# Patient Record
Sex: MALE | Employment: FULL TIME | URBAN - METROPOLITAN AREA
[De-identification: names, ages, dates, MRNs, and addresses within clinical notes are randomized per-mention and may not be internally consistent; named-entity substitution may affect disease eponyms.]

---

## 2025-02-07 ENCOUNTER — OFFICE VISIT (OUTPATIENT)
Dept: PRIMARY CARE | Facility: CLINIC | Age: 45
End: 2025-02-07
Payer: COMMERCIAL

## 2025-02-07 VITALS
SYSTOLIC BLOOD PRESSURE: 112 MMHG | RESPIRATION RATE: 16 BRPM | TEMPERATURE: 98.2 F | BODY MASS INDEX: 26.44 KG/M2 | HEART RATE: 96 BPM | DIASTOLIC BLOOD PRESSURE: 74 MMHG | HEIGHT: 74 IN | WEIGHT: 206 LBS | OXYGEN SATURATION: 99 %

## 2025-02-07 DIAGNOSIS — R47.02 DYSPHASIA: Primary | ICD-10-CM

## 2025-02-07 PROBLEM — G47.00 INSOMNIA: Status: ACTIVE | Noted: 2025-02-07

## 2025-02-07 PROBLEM — M25.572 LEFT ANKLE PAIN: Status: RESOLVED | Noted: 2025-02-07 | Resolved: 2025-02-07

## 2025-02-07 PROBLEM — M23.90 INTERNAL DERANGEMENT OF KNEE: Status: ACTIVE | Noted: 2020-04-09

## 2025-02-07 PROBLEM — G89.29 CHRONIC PAIN: Status: ACTIVE | Noted: 2025-02-07

## 2025-02-07 PROBLEM — R03.0 ELEVATED BLOOD PRESSURE READING: Status: ACTIVE | Noted: 2025-02-07

## 2025-02-07 PROBLEM — S90.30XA FOOT CONTUSION: Status: RESOLVED | Noted: 2025-02-07 | Resolved: 2025-02-07

## 2025-02-07 PROBLEM — J06.9 ACUTE URI: Status: RESOLVED | Noted: 2025-02-07 | Resolved: 2025-02-07

## 2025-02-07 PROBLEM — F90.9 ADHD: Status: ACTIVE | Noted: 2025-02-07

## 2025-02-07 PROBLEM — S92.909A FOOT FRACTURE: Status: RESOLVED | Noted: 2025-02-07 | Resolved: 2025-02-07

## 2025-02-07 PROBLEM — R05.8 NONPRODUCTIVE COUGH: Status: RESOLVED | Noted: 2025-02-07 | Resolved: 2025-02-07

## 2025-02-07 PROCEDURE — 3008F BODY MASS INDEX DOCD: CPT

## 2025-02-07 PROCEDURE — 99204 OFFICE O/P NEW MOD 45 MIN: CPT

## 2025-02-07 RX ORDER — DOXEPIN HYDROCHLORIDE 100 MG/1
200 CAPSULE ORAL NIGHTLY
COMMUNITY
Start: 2025-01-17

## 2025-02-07 RX ORDER — BUPRENORPHINE HYDROCHLORIDE AND NALOXONE HYDROCHLORIDE DIHYDRATE 8; 2 MG/1; MG/1
TABLET SUBLINGUAL 2 TIMES DAILY
COMMUNITY
Start: 2025-01-29

## 2025-02-07 RX ORDER — AMITRIPTYLINE HYDROCHLORIDE 100 MG/1
150 TABLET ORAL NIGHTLY
COMMUNITY
Start: 2024-12-11

## 2025-02-07 RX ORDER — DEXTROAMPHETAMINE SACCHARATE, AMPHETAMINE ASPARTATE, DEXTROAMPHETAMINE SULFATE AND AMPHETAMINE SULFATE 7.5; 7.5; 7.5; 7.5 MG/1; MG/1; MG/1; MG/1
1 TABLET ORAL
COMMUNITY
Start: 2025-01-29

## 2025-02-07 NOTE — PROGRESS NOTES
Subjective   Juan Robb is a 44 y.o. male who presents for Weight Loss.    Patient presents with approximately 5-month history of progressive dysphagia.  Over this timeframe due to difficulty swallowing he is lost approximately 60 pounds.  He says that initially he was having trouble swallowing food and other solids, however now he is having difficulty with drinking liquids.  This has been particularly bothersome over the past month.  He drinks approximately 2-3 small cups of water a day.  He also has frequent episodes of spitting up food or liquids that he has tried to ingest.  He has been having difficulty taking his medications and has to dissolve them partially before swallowing.    He has not seen a PCP in many years and past medical history is significant for chronic pain, ADHD, 7.5-pack-year history of smoking, occasional alcohol intake 2-3 times a week 1-2 drinks at a time.    He has never had GI issues and has never seen a GI physician.    He denies any fevers, chills, nausea, abdominal pain, changes in bowel or bladder habits, abdominal bloating.      Visit Vitals  /74 (BP Location: Right arm, Patient Position: Sitting, BP Cuff Size: Adult)   Pulse 96   Temp 36.8 °C (98.2 °F)   Resp 16      Objective   Physical Exam  Vitals reviewed.   Constitutional:       General: He is not in acute distress.     Appearance: Normal appearance. He is not toxic-appearing.   HENT:      Head: Normocephalic and atraumatic.      Nose: Nose normal.      Mouth/Throat:      Mouth: Mucous membranes are dry.   Eyes:      Extraocular Movements: Extraocular movements intact.   Cardiovascular:      Rate and Rhythm: Normal rate and regular rhythm.      Heart sounds: No murmur heard.     No friction rub. No gallop.   Pulmonary:      Effort: Pulmonary effort is normal. No respiratory distress.      Breath sounds: Normal breath sounds. No wheezing, rhonchi or rales.   Skin:     General: Skin is warm and dry.   Neurological:  "     General: No focal deficit present.      Mental Status: He is alert.   Psychiatric:         Mood and Affect: Mood normal.         Behavior: Behavior normal.            Assessment/Plan      Assessment & Plan  Dysphasia  Patient presents with approximate 5-month history of chronic dysphagia that has been progressive.  Initially it was with solids and now has been with liquids for the past 1 to 2 months.  Examination is unremarkable other than slightly dry mucous membranes.  Skin turgor is normal.  Vital signs are stable.  Although he has had very little food and fluid hydration he hemodynamically appears stable does not presenting with signs or symptoms of acute dehydration.  Based on our chart review patient has lost approximately 60 pounds since last he was weighed.  Current differential remains broad and includes but is not limited to achalasia, esophageal mass, Zenker's diverticulum, Jimenez's esophagus.  Given concern for possible dehydration and will TEODORO ordered stat CBC and CMP.  Also evaluate for any signs of anemia blood loss.  Provided referral to GI and also have ordered esophagram which will be performed this upcoming Tuesday.  He send results of lab work will determine if patient needs to have urgent fluid resuscitation.  Orders:    CBC; Future    Comprehensive metabolic panel; Future    Referral to Gastroenterology; Future    [place] [facility] esophagram; Future           This note was partially created using voice recognition software and is inherently subject to errors including those of syntax and \"sound-alike\" substitutions which may escape proofreading. In such instances, original meaning may be extrapolated by contextual derivation.      "

## 2025-02-11 ENCOUNTER — HOSPITAL ENCOUNTER (OUTPATIENT)
Dept: RADIOLOGY | Facility: HOSPITAL | Age: 45
Discharge: HOME | End: 2025-02-11
Payer: COMMERCIAL

## 2025-02-11 ENCOUNTER — DOCUMENTATION (OUTPATIENT)
Dept: HEMATOLOGY/ONCOLOGY | Facility: HOSPITAL | Age: 45
End: 2025-02-11
Payer: COMMERCIAL

## 2025-02-11 DIAGNOSIS — K22.89 ESOPHAGEAL MASS: Primary | ICD-10-CM

## 2025-02-11 DIAGNOSIS — K22.2 ESOPHAGEAL STRICTURE: ICD-10-CM

## 2025-02-11 DIAGNOSIS — R47.02 DYSPHASIA: ICD-10-CM

## 2025-02-11 PROCEDURE — 2500000001 HC RX 250 WO HCPCS SELF ADMINISTERED DRUGS (ALT 637 FOR MEDICARE OP)

## 2025-02-11 PROCEDURE — 74220 X-RAY XM ESOPHAGUS 1CNTRST: CPT

## 2025-02-11 PROCEDURE — 2500000005 HC RX 250 GENERAL PHARMACY W/O HCPCS

## 2025-02-11 PROCEDURE — A9698 NON-RAD CONTRAST MATERIALNOC: HCPCS

## 2025-02-11 RX ADMIN — ANTACID/ANTIFLATULENT 1 PACKET: 380; 550; 10; 10 GRANULE, EFFERVESCENT ORAL at 11:42

## 2025-02-11 RX ADMIN — BARIUM SULFATE 25 ML: 980 POWDER, FOR SUSPENSION ORAL at 11:42

## 2025-02-11 NOTE — PROGRESS NOTES
Referral placed to [facility] by Emmett Silverio MD for esophageal mass, discovered incidentally on esophagram 02/07/2025. I called the patient and discussed his imaging results and the referral. Advised appointment for further evaluation. Patient is agreeable. Visit scheduled 02/14/2025 at [facility] at 1200. Patient appointment information. All questions answered & [facility] contact information provided.     DAVID Langley.[facility]

## 2025-02-11 NOTE — RESULT ENCOUNTER NOTE
Reviewed findings of esophagram with patient. Referral to [facility] diagnostic clinic has been placed to help with coordination of follow up testing and care.

## 2025-02-11 NOTE — PROGRESS NOTES
Esophagram revealing stricture of distal esophagus. Referral to [facility] diagnostic clinic provided to expedite workup including PET/CT, endoscopy, and tissue biopsy.

## 2025-02-12 RX ORDER — NAPROXEN 500 MG/1
500 TABLET ORAL 2 TIMES DAILY
COMMUNITY
Start: 2020-02-27 | End: 2025-02-14 | Stop reason: WASHOUT

## 2025-02-14 ENCOUNTER — APPOINTMENT (OUTPATIENT)
Dept: LAB | Facility: CLINIC | Age: 45
End: 2025-02-14
Payer: COMMERCIAL

## 2025-02-14 ENCOUNTER — OFFICE VISIT (OUTPATIENT)
Dept: HEMATOLOGY/ONCOLOGY | Facility: CLINIC | Age: 45
End: 2025-02-14
Payer: COMMERCIAL

## 2025-02-14 VITALS
BODY MASS INDEX: 26.62 KG/M2 | OXYGEN SATURATION: 99 % | TEMPERATURE: 98.4 F | HEIGHT: 73 IN | WEIGHT: 200.84 LBS | DIASTOLIC BLOOD PRESSURE: 76 MMHG | SYSTOLIC BLOOD PRESSURE: 128 MMHG | HEART RATE: 80 BPM | RESPIRATION RATE: 18 BRPM

## 2025-02-14 DIAGNOSIS — R63.4 WEIGHT LOSS: ICD-10-CM

## 2025-02-14 DIAGNOSIS — K22.89 ESOPHAGEAL MASS: ICD-10-CM

## 2025-02-14 DIAGNOSIS — R13.10 DYSPHAGIA, UNSPECIFIED TYPE: ICD-10-CM

## 2025-02-14 DIAGNOSIS — K22.2 ESOPHAGEAL STRICTURE: Primary | ICD-10-CM

## 2025-02-14 PROBLEM — R03.0 ELEVATED BLOOD PRESSURE READING: Status: RESOLVED | Noted: 2025-02-07 | Resolved: 2025-02-14

## 2025-02-14 PROBLEM — R13.19 OTHER DYSPHAGIA: Status: ACTIVE | Noted: 2025-02-14

## 2025-02-14 LAB
ALBUMIN SERPL BCP-MCNC: 4.1 G/DL (ref 3.4–5)
ALP SERPL-CCNC: 106 U/L (ref 33–120)
ALT SERPL W P-5'-P-CCNC: 40 U/L (ref 10–52)
ANION GAP SERPL CALC-SCNC: 15 MMOL/L (ref 10–20)
AST SERPL W P-5'-P-CCNC: 27 U/L (ref 9–39)
BILIRUB SERPL-MCNC: 0.6 MG/DL (ref 0–1.2)
BUN SERPL-MCNC: 15 MG/DL (ref 6–23)
CALCIUM SERPL-MCNC: 9.3 MG/DL (ref 8.6–10.6)
CHLORIDE SERPL-SCNC: 97 MMOL/L (ref 98–107)
CO2 SERPL-SCNC: 28 MMOL/L (ref 21–32)
CREAT SERPL-MCNC: 0.73 MG/DL (ref 0.5–1.3)
EGFRCR SERPLBLD CKD-EPI 2021: >90 ML/MIN/1.73M*2
ERYTHROCYTE [DISTWIDTH] IN BLOOD BY AUTOMATED COUNT: 16.1 % (ref 11.5–14.5)
GLUCOSE SERPL-MCNC: 88 MG/DL (ref 74–99)
HCT VFR BLD AUTO: 31.5 % (ref 41–52)
HGB BLD-MCNC: 9.9 G/DL (ref 13.5–17.5)
MCH RBC QN AUTO: 23 PG (ref 26–34)
MCHC RBC AUTO-ENTMCNC: 31.4 G/DL (ref 32–36)
MCV RBC AUTO: 73 FL (ref 80–100)
NRBC BLD-RTO: ABNORMAL /100{WBCS}
PLATELET # BLD AUTO: 349 X10*3/UL (ref 150–450)
POTASSIUM SERPL-SCNC: 3.8 MMOL/L (ref 3.5–5.3)
PROT SERPL-MCNC: 6.8 G/DL (ref 6.4–8.2)
RBC # BLD AUTO: 4.3 X10*6/UL (ref 4.5–5.9)
SODIUM SERPL-SCNC: 136 MMOL/L (ref 136–145)
WBC # BLD AUTO: 5.6 X10*3/UL (ref 4.4–11.3)

## 2025-02-14 PROCEDURE — 99204 OFFICE O/P NEW MOD 45 MIN: CPT | Performed by: NURSE PRACTITIONER

## 2025-02-14 PROCEDURE — 85027 COMPLETE CBC AUTOMATED: CPT | Performed by: FAMILY MEDICINE

## 2025-02-14 PROCEDURE — 99214 OFFICE O/P EST MOD 30 MIN: CPT | Performed by: NURSE PRACTITIONER

## 2025-02-14 PROCEDURE — 3008F BODY MASS INDEX DOCD: CPT | Performed by: NURSE PRACTITIONER

## 2025-02-14 PROCEDURE — 80053 COMPREHEN METABOLIC PANEL: CPT | Performed by: FAMILY MEDICINE

## 2025-02-14 ASSESSMENT — ENCOUNTER SYMPTOMS
CARDIOVASCULAR NEGATIVE: 1
NEUROLOGICAL NEGATIVE: 1
VOMITING: 1
DEPRESSION: 0
APPETITE CHANGE: 1
UNEXPECTED WEIGHT CHANGE: 1
MYALGIAS: 1
ENDOCRINE NEGATIVE: 1
EYES NEGATIVE: 1
TROUBLE SWALLOWING: 1
OCCASIONAL FEELINGS OF UNSTEADINESS: 0
RESPIRATORY NEGATIVE: 1
PSYCHIATRIC NEGATIVE: 1
ARTHRALGIAS: 1
LOSS OF SENSATION IN FEET: 0
HEMATOLOGIC/LYMPHATIC NEGATIVE: 1

## 2025-02-14 ASSESSMENT — PAIN SCALES - GENERAL: PAINLEVEL_OUTOF10: 0-NO PAIN

## 2025-02-14 NOTE — PROGRESS NOTES
".Patient ID: Juan Robb is a 44 y.o. male.  Referring Physician: Emmett Dominguez MD  [address] [place] [address]  Primary Care Provider: Darryl Elias DO     Diagnostic clinic initial visit      HPI  Patient presented to his PCP, Darryl Elias 02/07/2025 with approximate 5-month history of chronic dysphagia that has been progressive. Initially it was with solids and now has been with liquids for the past 1 to 2 months. He has lost approximately 60 pounds since last he was weighed 4 or 5 months ago. Esophagram 02/11/2025 showing long segment esophageal stricture in the distal esophagus, concerning for underlying neoplasm. Patient referred to the diagnostic clinic for further workup and management.     Subjective   Please refer to \"Notes/Cancer History\" above for complete History of present illness.     Mr Jaun Robb     - Presents to clinic alone.   - Dysphagia is getting worse.   - Intermittent emesis.   - Started Boost supplements yesterday.   - Chronic pain from injuries. Taking Suboxone for pain. Follows with pain management.   - Continues to work full time at a .      Review of Systems:   Review of Systems   Constitutional:  Positive for appetite change and unexpected weight change.   HENT:   Positive for trouble swallowing.    Eyes: Negative.    Respiratory: Negative.     Cardiovascular: Negative.    Gastrointestinal:  Positive for vomiting.   Endocrine: Negative.    Genitourinary: Negative.     Musculoskeletal:  Positive for arthralgias and myalgias.        Chronic pain from prior injuries    Skin: Negative.    Neurological: Negative.    Hematological: Negative.    Psychiatric/Behavioral: Negative.           MEDICAL HISTORY  Past Medical History:   Diagnosis Date    Acute URI 02/07/2025    Foot contusion 02/07/2025    Left ankle pain 02/07/2025    Nonproductive cough 02/07/2025       FAMILY HISTORY  No family history on file.  Mom-Hodgkin's Lymphoma- passed away " "at 35    TOBACCO HISTORY  Tobacco Use: High Risk (2/7/2025)    Patient History     Smoking Tobacco Use: Every Day     Smokeless Tobacco Use: Never     Passive Exposure: Not on file   Smokes 1/2 PPD x 17 years. Drinks 2-3 shots vodka twice weekly.    SOCIAL HISTORY  Social Connections: Not on file   Works full time at [employer]. Lives alone. No children. Dad supportive in care.      Outpatient Medication Profile:  Current Outpatient Medications on File Prior to Visit   Medication Sig Dispense Refill    amitriptyline (Elavil) 100 mg tablet Take 1.5 tablets (150 mg) by mouth once daily at bedtime.      amphetamine-dextroamphetamine (Adderall) 30 mg tablet Take 1 tablet (30 mg) by mouth every 12 hours.      buprenorphine-naloxone (Suboxone) 8-2 mg SL tablet Place under the tongue 2 times a day.      doxepin (SINEquan) 100 mg capsule Take 2 capsules (200 mg) by mouth once daily at bedtime.      naproxen (Naprosyn) 500 mg tablet Take 1 tablet (500 mg) by mouth 2 times a day.       No current facility-administered medications on file prior to visit.         Performance Status:  Symptomatic; fully ambulatory     Vitals and Measurements:   Vitals:    02/14/25 1224   BP: 128/76   Pulse: 80   Resp: 18   Temp: 36.9 °C (98.4 °F)   TempSrc: Core   SpO2: 99%   Weight: 91.1 kg (200 lb 13.4 oz)   Height: (S) 1.849 m (6' 0.8\")   PainSc: 0-No pain        Physical Exam:   Physical Exam  Constitutional:       Appearance: Normal appearance.   HENT:      Head: Normocephalic and atraumatic.      Nose: Nose normal.      Mouth/Throat:      Mouth: Mucous membranes are moist.      Pharynx: Oropharynx is clear.   Eyes:      Conjunctiva/sclera: Conjunctivae normal.   Cardiovascular:      Rate and Rhythm: Normal rate and regular rhythm.      Pulses: Normal pulses.      Heart sounds: Normal heart sounds.   Pulmonary:      Effort: Pulmonary effort is normal.      Breath sounds: Normal breath sounds.   Abdominal:      General: Bowel sounds are " normal.      Palpations: Abdomen is soft.   Musculoskeletal:         General: Normal range of motion.      Cervical back: Neck supple.   Skin:     General: Skin is warm and dry.   Neurological:      General: No focal deficit present.      Mental Status: He is alert and oriented to person, place, and time.   Psychiatric:         Mood and Affect: Mood normal.         Behavior: Behavior normal.            Lab Results:  I have reviewed these laboratory results:     No visits with results within 1 Month(s) from this visit.   Latest known visit with results is:   [facility]acy Encounter on 01/18/2021   Component Date Value Ref Range Status    SARS-CoV-2 Result 01/18/2021 NOT DETECTED  Not Detected Final    Date of Symptom Onset? (YYYYMMDD)? 01/18/2021 20,210,113   Final         Radiology Result:  I have reviewed the latest Imaging in PACS and the findings are noted in this note. I discussed the results of the latest imaging with the patient. All previous imaging were reviewed at the time it was completed. Full records are available in the EMR for review as well.     [place] [facility] ESOPHAGRAM; 2/11/2025 11:38 am      IMPRESSION:  Long segment esophageal stricture in the distal esophagus, concerning  for underlying neoplasm. The differential diagnosis includes  achalasia, felt to be less likely. Further evaluation with PET-CT,  endoscopy and tissue diagnosis is strongly recommended.     Assessment and Plan:   Assessment/Plan   Mr. Juan Robb is a 44 y.o. male who was referred to the diagnostic clinic for dysphagia and esophogeal stricture seen on esophogram 02/11/2025, concerning for malignancy.     # Diagnostic clinic course  - STAT EGD ordered.   - CBCD/CMP ordered by his PCP. I asked he have these labs drawn today.   - He has a follow up with GI 03/18/2025.  - Patient sent to scheduling and labs prior to dc from clinic.   - Phone visit scheduled 03/07/2025 to follow up on imaging, labs, and discuss next steps.     # Smoking  cessation  - Offered referral to smoking cessation program which patient declined at this time. I am happy to make this referral at anytime if patient desires.     # Health maintenance  -  Continue follow with PCP, Dr. Darryl Elias for wellness visits and age appropriate cancer screenings.     DISCLAIMER:   In preparing for this visit and writing this note, I reviewed all the previous electronic medical records (labs, imaging and medical charts) of the patient available in the physician portal. Significant findings which helped in decision making are recorded  in this chart.     The plan was discussed with the patient. We gave him ample opportunities to ask questions. All questions were answered to his satisfaction and he verbalized understanding.

## 2025-02-25 ENCOUNTER — HOSPITAL ENCOUNTER (OUTPATIENT)
Dept: GASTROENTEROLOGY | Facility: HOSPITAL | Age: 45
Discharge: HOME | End: 2025-02-25
Payer: COMMERCIAL

## 2025-02-25 ENCOUNTER — HOSPITAL ENCOUNTER (OUTPATIENT)
Dept: RADIOLOGY | Facility: HOSPITAL | Age: 45
Discharge: HOME | End: 2025-02-25
Payer: COMMERCIAL

## 2025-02-25 VITALS
RESPIRATION RATE: 16 BRPM | DIASTOLIC BLOOD PRESSURE: 66 MMHG | HEIGHT: 74 IN | HEART RATE: 69 BPM | BODY MASS INDEX: 25.89 KG/M2 | OXYGEN SATURATION: 100 % | TEMPERATURE: 98.6 F | SYSTOLIC BLOOD PRESSURE: 108 MMHG | WEIGHT: 201.72 LBS

## 2025-02-25 DIAGNOSIS — R13.10 DYSPHAGIA, UNSPECIFIED TYPE: ICD-10-CM

## 2025-02-25 DIAGNOSIS — K22.2 ESOPHAGEAL STRICTURE: ICD-10-CM

## 2025-02-25 DIAGNOSIS — R13.19 OTHER DYSPHAGIA: Primary | ICD-10-CM

## 2025-02-25 DIAGNOSIS — R63.4 WEIGHT LOSS: ICD-10-CM

## 2025-02-25 PROCEDURE — 99152 MOD SED SAME PHYS/QHP 5/>YRS: CPT | Performed by: STUDENT IN AN ORGANIZED HEALTH CARE EDUCATION/TRAINING PROGRAM

## 2025-02-25 PROCEDURE — 7100000010 HC PHASE TWO TIME - EACH INCREMENTAL 1 MINUTE

## 2025-02-25 PROCEDURE — 71260 CT THORAX DX C+: CPT

## 2025-02-25 PROCEDURE — 3700000012 HC SEDATION LEVEL 5+ TIME - INITIAL 15 MINUTES 5/> YEARS

## 2025-02-25 PROCEDURE — 2500000004 HC RX 250 GENERAL PHARMACY W/ HCPCS (ALT 636 FOR OP/ED): Mod: JZ | Performed by: STUDENT IN AN ORGANIZED HEALTH CARE EDUCATION/TRAINING PROGRAM

## 2025-02-25 PROCEDURE — 2550000001 HC RX 255 CONTRASTS

## 2025-02-25 PROCEDURE — 43202 ESOPHAGOSCOPY FLEX BIOPSY: CPT | Performed by: STUDENT IN AN ORGANIZED HEALTH CARE EDUCATION/TRAINING PROGRAM

## 2025-02-25 PROCEDURE — 7100000009 HC PHASE TWO TIME - INITIAL BASE CHARGE

## 2025-02-25 PROCEDURE — 2500000005 HC RX 250 GENERAL PHARMACY W/O HCPCS: Performed by: STUDENT IN AN ORGANIZED HEALTH CARE EDUCATION/TRAINING PROGRAM

## 2025-02-25 PROCEDURE — P9045 ALBUMIN (HUMAN), 5%, 250 ML: HCPCS | Mod: JZ | Performed by: STUDENT IN AN ORGANIZED HEALTH CARE EDUCATION/TRAINING PROGRAM

## 2025-02-25 PROCEDURE — 88305 TISSUE EXAM BY PATHOLOGIST: CPT | Mod: TC,AHULAB | Performed by: STUDENT IN AN ORGANIZED HEALTH CARE EDUCATION/TRAINING PROGRAM

## 2025-02-25 PROCEDURE — 99153 MOD SED SAME PHYS/QHP EA: CPT | Performed by: STUDENT IN AN ORGANIZED HEALTH CARE EDUCATION/TRAINING PROGRAM

## 2025-02-25 PROCEDURE — 3700000013 HC SEDATION LEVEL 5+ TIME - EACH ADDITIONAL 15 MINUTES

## 2025-02-25 PROCEDURE — 99223 1ST HOSP IP/OBS HIGH 75: CPT | Performed by: STUDENT IN AN ORGANIZED HEALTH CARE EDUCATION/TRAINING PROGRAM

## 2025-02-25 RX ORDER — MIDAZOLAM HYDROCHLORIDE 5 MG/ML
INJECTION, SOLUTION INTRAMUSCULAR; INTRAVENOUS AS NEEDED
Status: COMPLETED | OUTPATIENT
Start: 2025-02-25 | End: 2025-02-25

## 2025-02-25 RX ORDER — SODIUM CHLORIDE, SODIUM LACTATE, POTASSIUM CHLORIDE, CALCIUM CHLORIDE 600; 310; 30; 20 MG/100ML; MG/100ML; MG/100ML; MG/100ML
100 INJECTION, SOLUTION INTRAVENOUS CONTINUOUS
Status: DISCONTINUED | OUTPATIENT
Start: 2025-02-25 | End: 2025-02-26 | Stop reason: HOSPADM

## 2025-02-25 RX ORDER — FENTANYL CITRATE 50 UG/ML
INJECTION, SOLUTION INTRAMUSCULAR; INTRAVENOUS AS NEEDED
Status: COMPLETED | OUTPATIENT
Start: 2025-02-25 | End: 2025-02-25

## 2025-02-25 RX ORDER — ALBUMIN HUMAN 50 G/1000ML
SOLUTION INTRAVENOUS CONTINUOUS PRN
Status: COMPLETED | OUTPATIENT
Start: 2025-02-25 | End: 2025-02-25

## 2025-02-25 RX ADMIN — IOHEXOL 100 ML: 350 INJECTION, SOLUTION INTRAVENOUS at 16:20

## 2025-02-25 RX ADMIN — FENTANYL CITRATE 25 MCG: 50 INJECTION, SOLUTION INTRAMUSCULAR; INTRAVENOUS at 12:20

## 2025-02-25 RX ADMIN — FENTANYL CITRATE 25 MCG: 50 INJECTION, SOLUTION INTRAMUSCULAR; INTRAVENOUS at 12:15

## 2025-02-25 RX ADMIN — FENTANYL CITRATE 25 MCG: 50 INJECTION, SOLUTION INTRAMUSCULAR; INTRAVENOUS at 12:35

## 2025-02-25 RX ADMIN — MIDAZOLAM HYDROCHLORIDE 2 MG: 5 INJECTION, SOLUTION INTRAMUSCULAR; INTRAVENOUS at 12:05

## 2025-02-25 RX ADMIN — MIDAZOLAM HYDROCHLORIDE 1 MG: 5 INJECTION, SOLUTION INTRAMUSCULAR; INTRAVENOUS at 12:10

## 2025-02-25 RX ADMIN — MIDAZOLAM HYDROCHLORIDE 1 MG: 5 INJECTION, SOLUTION INTRAMUSCULAR; INTRAVENOUS at 12:15

## 2025-02-25 RX ADMIN — FENTANYL CITRATE 50 MCG: 50 INJECTION, SOLUTION INTRAMUSCULAR; INTRAVENOUS at 12:03

## 2025-02-25 RX ADMIN — MIDAZOLAM HYDROCHLORIDE 1 MG: 5 INJECTION, SOLUTION INTRAMUSCULAR; INTRAVENOUS at 12:20

## 2025-02-25 RX ADMIN — FENTANYL CITRATE 25 MCG: 50 INJECTION, SOLUTION INTRAMUSCULAR; INTRAVENOUS at 12:25

## 2025-02-25 RX ADMIN — ALBUMIN HUMAN 12.5 G: 0.05 INJECTION, SOLUTION INTRAVENOUS at 12:21

## 2025-02-25 RX ADMIN — MIDAZOLAM HYDROCHLORIDE 3 MG: 5 INJECTION, SOLUTION INTRAMUSCULAR; INTRAVENOUS at 12:03

## 2025-02-25 RX ADMIN — FENTANYL CITRATE 25 MCG: 50 INJECTION, SOLUTION INTRAMUSCULAR; INTRAVENOUS at 12:10

## 2025-02-25 RX ADMIN — FENTANYL CITRATE 25 MCG: 50 INJECTION, SOLUTION INTRAMUSCULAR; INTRAVENOUS at 12:05

## 2025-02-25 RX ADMIN — MIDAZOLAM HYDROCHLORIDE 2 MG: 5 INJECTION, SOLUTION INTRAMUSCULAR; INTRAVENOUS at 12:25

## 2025-02-25 RX ADMIN — SODIUM CHLORIDE, POTASSIUM CHLORIDE, SODIUM LACTATE AND CALCIUM CHLORIDE 100 ML/HR: 600; 310; 30; 20 INJECTION, SOLUTION INTRAVENOUS at 11:07

## 2025-02-25 RX ADMIN — BENZOCAINE 1 SPRAY: 200 SPRAY DENTAL; ORAL; PERIODONTAL at 12:02

## 2025-02-25 ASSESSMENT — PAIN SCALES - GENERAL

## 2025-02-25 ASSESSMENT — COLUMBIA-SUICIDE SEVERITY RATING SCALE - C-SSRS
1. IN THE PAST MONTH, HAVE YOU WISHED YOU WERE DEAD OR WISHED YOU COULD GO TO SLEEP AND NOT WAKE UP?: NO
6. HAVE YOU EVER DONE ANYTHING, STARTED TO DO ANYTHING, OR PREPARED TO DO ANYTHING TO END YOUR LIFE?: NO
2. HAVE YOU ACTUALLY HAD ANY THOUGHTS OF KILLING YOURSELF?: NO

## 2025-02-25 NOTE — H&P
"History Of Present Illness  Juan Robb is a 44 y.o. male presenting with solid and liquid dysphagia. He has an 80lbs weight loss. He has not been able to eat for 6 weeks. Has a history of smoking.     Past Medical History  Past Medical History:   Diagnosis Date    Acute URI 02/07/2025    Foot contusion 02/07/2025    Left ankle pain 02/07/2025    Nonproductive cough 02/07/2025     Surgical History  Past Surgical History:   Procedure Laterality Date    HERNIA REPAIR      KNEE SURGERY       Social History  He reports that he has been smoking cigarettes. He has never used smokeless tobacco. He reports current alcohol use of about 3.0 standard drinks of alcohol per week. He reports that he does not currently use drugs.    Family History  No family history on file.     Allergies  No Known Allergies  Review of Systems  Pre-sedation Evaluation:  ASA Classification - ASA 2 - Patient with mild systemic disease with no functional limitations  Mallampati Score - II (hard and soft palate, upper portion of tonsils and uvula visible)    Physical Exam   Healthy adult in no acute distress, non toxic appearing  EOMI, no jaundice  Heart is RRR, no murmurs  Lungs are CTAB  Abdomen is soft and nttp  No LE edema  AOx3    Last Recorded Vitals  Blood pressure 119/69, pulse 103, temperature 37 °C (98.6 °F), temperature source Temporal, resp. rate 16, height 1.88 m (6' 2\"), weight 91.5 kg (201 lb 11.5 oz), SpO2 99%.     Assessment/Plan   Proceed with EGD.        PTA/Current Medications:  (Not in a hospital admission)    Current Outpatient Medications   Medication Sig Dispense Refill    amphetamine-dextroamphetamine (Adderall) 30 mg tablet Take 1 tablet (30 mg) by mouth every 12 hours.      buprenorphine-naloxone (Suboxone) 8-2 mg SL tablet Place under the tongue 2 times a day.      doxepin (SINEquan) 100 mg capsule Take 2 capsules (200 mg) by mouth once daily at bedtime.      amitriptyline (Elavil) 100 mg tablet Take 1.5 tablets " (150 mg) by mouth once daily at bedtime.       Current Facility-Administered Medications   Medication Dose Route Frequency Provider Last Rate Last Admin    lactated Ringer's infusion  100 mL/hr intravenous Continuous Cesar Carrasco  mL/hr at 02/25/25 1107 100 mL/hr at 02/25/25 1107     Cesar Carrasco MD

## 2025-02-26 ENCOUNTER — APPOINTMENT (OUTPATIENT)
Dept: SURGERY | Facility: HOSPITAL | Age: 45
End: 2025-02-26
Payer: COMMERCIAL

## 2025-02-26 ENCOUNTER — DOCUMENTATION (OUTPATIENT)
Dept: HEMATOLOGY/ONCOLOGY | Facility: HOSPITAL | Age: 45
End: 2025-02-26
Payer: COMMERCIAL

## 2025-02-26 DIAGNOSIS — K22.2 ESOPHAGEAL STRICTURE: Primary | ICD-10-CM

## 2025-02-26 ASSESSMENT — PAIN SCALES - GENERAL: PAINLEVEL_OUTOF10: 0 - NO PAIN

## 2025-02-26 NOTE — PROGRESS NOTES
I called patient today to discuss EGD yesterday. Due to the severity of the esophageal stricture and inability to swallow PO, I advised admission to the hospital for feeding tube placement. Patient stated he has some things to take care of, and will go to [facility] ER afterwards.     Mile Mcginnis CNP  [facility]

## 2025-02-27 ENCOUNTER — TELEPHONE VISIT (OUTPATIENT)
Dept: GASTROENTEROLOGY | Facility: CLINIC | Age: 45
End: 2025-02-27
Payer: COMMERCIAL

## 2025-02-27 DIAGNOSIS — K22.2 ESOPHAGEAL STRICTURE: Primary | ICD-10-CM

## 2025-02-27 LAB
LABORATORY COMMENT REPORT: NORMAL
PATH REPORT.COMMENTS IMP SPEC: NORMAL
PATH REPORT.FINAL DX SPEC: NORMAL
PATH REPORT.GROSS SPEC: NORMAL
PATH REPORT.RELEVANT HX SPEC: NORMAL
PATH REPORT.TOTAL CANCER: NORMAL

## 2025-02-27 NOTE — PROGRESS NOTES
BRIEF HEPATOLOGY PHONE NOTE    I contacted Juan Robb today by phone for follow up.  I performed an upper endoscopy on this patient 2 days ago.  He was referred to the endoscopy unit because of 6 weeks of inability to swallow and dysphagia.  He was referred by his primary care doctor to the diagnostic clinic with medical oncology, they performed an esophagram which demonstrated a very tight stricture in the mid to distal esophagus concerning for malignancy.  Of note, the patient has a family history of lymphoma and his mother passed away when she was 35 from lymphoma.  He has a history of smoking, half a pack of cigarettes daily.  He has also lost approximately 80 pounds since the onset of this issue 6 weeks ago.    The upper endoscopy was scheduled with moderate sedation and despite 200 mcg of fentanyl and 10 mg of Versed, the patient has significant difficulties with sedation.  Visualization of the stricture was difficult with a regular gastroscope and I switched to an ultrathin gastroscope but I was still unable to traverse the stricture.  I did not see any protruding or friable mass within the esophagus but significant amount of granulation tissue.  2 biopsies were obtained from the stricture site which demonstrated:    FINAL DIAGNOSIS   Mid esophageal stricture, biopsy:  - Acutely inflamed granulation tissue and fibrinopurulent exudate consistent with derivation from ulcer, no microorganisms identified.     The patient also had a stat CT of the abdomen and pelvis, there was a single small node in the upper left lung concerning for malignancy, there is a stricture in the distal esophagus again concerning for malignancy.  There is also a small hypodense lesion within the left lobe of the liver concerning for metastasis.     So far we have not be approved and the patient has malignancy.  I reviewed the patient's medications and none of the medications she is taking or any medications in the past would  have caused pill esophagitis and related stricture.  The patient is not immunosuppressed, no history of mucositis.  The ulceration findings on the biopsy are taken with caution.  I still do have a reasonable degree of suspicion for malignancy, certainly a disseminated lymphoma can present like this.  I have ordered an MRI to reassess the liver lesion.  The patient has a follow-up appointment with thoracic surgery, medical oncology as well as gastroenterology in the next 2 to 3 weeks.    I gave the patient precautions to go to the ER including difficulty managing secretions, inability to swallow, signs and symptoms of aspiration.  Unfortunately he has not been able to eat and I feel this may be a significant issue for him.  Should we determine a strategy for nutrition such as a feeding tube, he would be at significant risk for refeeding syndrome.    Cesar Carrasco MD

## 2025-03-07 ENCOUNTER — TELEMEDICINE (OUTPATIENT)
Dept: HEMATOLOGY/ONCOLOGY | Facility: CLINIC | Age: 45
End: 2025-03-07
Payer: COMMERCIAL

## 2025-03-07 DIAGNOSIS — R63.4 WEIGHT LOSS: ICD-10-CM

## 2025-03-07 DIAGNOSIS — R13.10 DYSPHAGIA, UNSPECIFIED TYPE: ICD-10-CM

## 2025-03-07 DIAGNOSIS — K22.2 ESOPHAGEAL STRICTURE: Primary | ICD-10-CM

## 2025-03-07 PROCEDURE — 99214 OFFICE O/P EST MOD 30 MIN: CPT | Performed by: NURSE PRACTITIONER

## 2025-03-07 ASSESSMENT — ENCOUNTER SYMPTOMS
HEMATOLOGIC/LYMPHATIC NEGATIVE: 1
TROUBLE SWALLOWING: 1
RESPIRATORY NEGATIVE: 1
NEUROLOGICAL NEGATIVE: 1
PSYCHIATRIC NEGATIVE: 1
EYES NEGATIVE: 1
ARTHRALGIAS: 1
ENDOCRINE NEGATIVE: 1
MYALGIAS: 1
UNEXPECTED WEIGHT CHANGE: 1
VOMITING: 1
APPETITE CHANGE: 1
CARDIOVASCULAR NEGATIVE: 1

## 2025-03-07 NOTE — PROGRESS NOTES
".Patient ID: Juan Robb is a 44 y.o. male.  Referring Physician: No referring provider defined for this encounter.  Primary Care Provider: Darryl Elias DO     Diagnostic clinic initial visit      HPI  Patient presented to his PCP, Darryl Elias 02/07/2025 with approximate 5-month history of chronic dysphagia that has been progressive. Initially it was with solids and now has been with liquids for the past 1 to 2 months. He has lost approximately 60 pounds since last he was weighed 4 or 5 months ago. Esophagram 02/11/2025 showing long segment esophageal stricture in the distal esophagus, concerning for underlying neoplasm. Patient referred to the diagnostic clinic for further workup and management.     EGD performed 02/25/2025: Visualization of the stricture was difficult with a regular gastroscope so they switched to an ultrathin gastroscope but were still unable to traverse the stricture.  There was not any protruding or friable mass within the esophagus but significant amount of granulation tissue.  2 biopsies were obtained from the stricture site which demonstrated: Acutely inflamed granulation tissue and fibrinopurulent exudate consistent with derivation from ulcer, no microorganisms identified. He then had a CT CAP 02/25/2025 showing esophageal stricture, lung and liver lesion. Patient referred to thoracic and scheduled for liver MRI.       Subjective   Please refer to \"Notes/Cancer History\" above for complete History of present illness.     Mr Juan Robb     - Continues Boost supplements. Trying to drink as much PO as possible.   - Still not able to eat solid food.   - Chronic pain from injuries. Taking Suboxone for pain. Follows with pain management.   - Stopped working for now as a .      Review of Systems:   Review of Systems   Constitutional:  Positive for appetite change and unexpected weight change.   HENT:   Positive for trouble swallowing.    Eyes: Negative.    Respiratory: " Negative.     Cardiovascular: Negative.    Gastrointestinal:  Positive for vomiting.   Endocrine: Negative.    Genitourinary: Negative.     Musculoskeletal:  Positive for arthralgias and myalgias.        Chronic pain from prior injuries    Skin: Negative.    Neurological: Negative.    Hematological: Negative.    Psychiatric/Behavioral: Negative.           MEDICAL HISTORY  Past Medical History:   Diagnosis Date    Acute URI 02/07/2025    Foot contusion 02/07/2025    Left ankle pain 02/07/2025    Nonproductive cough 02/07/2025       FAMILY HISTORY  No family history on file.  Mom-Hodgkin's Lymphoma- passed away at 35    TOBACCO HISTORY  Tobacco Use: High Risk (2/25/2025)    Patient History     Smoking Tobacco Use: Every Day     Smokeless Tobacco Use: Never     Passive Exposure: Not on file   Smokes 1/2 PPD x 17 years. Drinks 2-3 shots vodka twice weekly.    SOCIAL HISTORY  Social Connections: Not on file   Works full time at [email or web address]. Lives alone. No children. Dad supportive in care.      Outpatient Medication Profile:  Current Outpatient Medications on File Prior to Visit   Medication Sig Dispense Refill    amitriptyline (Elavil) 100 mg tablet Take 1.5 tablets (150 mg) by mouth once daily at bedtime.      amphetamine-dextroamphetamine (Adderall) 30 mg tablet Take 1 tablet (30 mg) by mouth every 12 hours.      buprenorphine-naloxone (Suboxone) 8-2 mg SL tablet Place under the tongue 2 times a day.      doxepin (SINEquan) 100 mg capsule Take 2 capsules (200 mg) by mouth once daily at bedtime.       No current facility-administered medications on file prior to visit.         Performance Status:  Symptomatic; fully ambulatory     Vitals and Measurements:   There were no vitals filed for this visit.  Telephone visit      Physical Exam:   Physical Exam   Telephone visit       Lab Results:  I have reviewed these laboratory results:     Hospital Outpatient Visit on 02/25/2025   Component Date Value Ref Range Status    Case  "Report 02/25/2025    Final                    Value:Surgical Pathology                                Case: [id number]                                  Authorizing Provider:  Cesar Carrasco MD      Collected:           02/25/2025 1252              Ordering Location:     [facility]    Received:            02/25/2025 1409              Pathologist:           Jaylen Adler MD                                                             Specimen:    ESOPHAGUS MID BIOPSY, Esophageal Stricture                                                 FINAL DIAGNOSIS 02/25/2025    Final                    Value:Mid esophageal stricture, biopsy:  - Acutely inflamed granulation tissue and fibrinopurulent exudate consistent with derivation from ulcer, no microorganisms identified.        02/25/2025    Final                    Value:By the signature on this report, the individual or group listed as making the Final Interpretation/Diagnosis certifies that they have reviewed this case.       Comment 02/25/2025    Final                    Value:Multiple deeper levels were examined.  Mucosal tissue is not identified.      Clinical History 02/25/2025    Final                    Value:Esophageal stricture [K22.2]  Weight loss [R63.4]  Dysphagia, unspecified type [R13.10]      Gross Description 02/25/2025    Final                    Value:A:  Received in formalin, labeled with the patient's name and hospital number and \"1,esophagus stricture \", is a fragment of tan soft tissue measuring 0.2 x 0.1 x 0.1 cm. The specimen is submitted in toto in 1 cassette.  KE/SBS             Radiology Result:  I have reviewed the latest Imaging in PACS and the findings are noted in this note. I discussed the results of the latest imaging with the patient. All previous imaging were reviewed at the time it was completed. Full records are available in the EMR for review as well.     [place] GI ESOPHAGRAM; 2/11/2025 11:38 am      IMPRESSION:  Long segment " esophageal stricture in the distal esophagus, concerning  for underlying neoplasm. The differential diagnosis includes  achalasia, felt to be less likely. Further evaluation with PET-CT,  endoscopy and tissue diagnosis is strongly recommended.  ==================================================  CT CHEST ABDOMEN PELVIS W IV CONTRAST; 2/25/2025 4:20 pm     IMPRESSION:  Suspicious esophageal stricture for malignancy. There is also a left  upper lobe suspicious pulmonary nodule for metastatic disease  especially without old studies to assess for comparison.      Indeterminate liver lesion. Consider further evaluation with liver  MRI to evaluate this further.      Ventral hernia containing fat      Diverticulosis. Constipation.      Enlarged prostate gland    Pathology results    Surgical Pathology Exam: [id number]  Order: [id number]   Collected 2/25/2025 12:52    Status: Final result    Visible to patient: No (inaccessible in [facility])    Dx: Esophageal stricture; Weight loss; Dy...    0 Result Notes       Component  Resulting Agency   FINAL DIAGNOSIS   Mid esophageal stricture, biopsy:  - Acutely inflamed granulation tissue and fibrinopurulent exudate consistent with derivation from ulcer, no microorganisms identified.   Electronically signed by Jaylen Adler MD on 2/27/2025 at 1403           Assessment and Plan:   Assessment/Plan   Mr. Juan Robb is a 44 y.o. male who was referred to the diagnostic clinic for dysphagia and esophogeal stricture seen on esophogram 02/11/2025, concerning for malignancy.     # Diagnostic clinic course  - EGD 02/25/2025: Visualization of the stricture was difficult with a regular gastroscope so they switched to an ultrathin gastroscope but were still unable to traverse the stricture.  There was not any protruding or friable mass within the esophagus but significant amount of granulation tissue.  2 biopsies were obtained from the stricture site which demonstrated: Acutely  inflamed granulation tissue and fibrinopurulent exudate consistent with derivation from ulcer, no microorganisms identified. He then had a CT CAP 02/25/2025 showing esophageal stricture, lung and liver lesion. Patient referred to thoracic and scheduled for liver MRI.    - Appointment with thoracic 03/12/2025.  - Liver MRI 03/17/2025.  - He has an appointment with GI 03/18/2025.  - I reviewed all these appointments with him. He is aware and agreeable. Appointment dates, times and locations provided. I scheduled a follow up visit with him 03/19/2025. We discussed hospital admission but he refused at this time.     # Smoking cessation  - Offered referral to smoking cessation program which patient declined at this time. I am happy to make this referral at anytime if patient desires.     # Health maintenance  -  Continue follow with PCP, Dr. Darryl Elias for wellness visits and age appropriate cancer screenings.     DISCLAIMER:   In preparing for this visit and writing this note, I reviewed all the previous electronic medical records (labs, imaging and medical charts) of the patient available in the physician portal. Significant findings which helped in decision making are recorded  in this chart.     The plan was discussed with the patient. We gave him ample opportunities to ask questions. All questions were answered to his satisfaction and he verbalized understanding.

## 2025-03-12 ENCOUNTER — HOSPITAL ENCOUNTER (OUTPATIENT)
Facility: HOSPITAL | Age: 45
Setting detail: OUTPATIENT SURGERY
End: 2025-03-12
Attending: THORACIC SURGERY (CARDIOTHORACIC VASCULAR SURGERY) | Admitting: THORACIC SURGERY (CARDIOTHORACIC VASCULAR SURGERY)
Payer: COMMERCIAL

## 2025-03-12 ENCOUNTER — PREP FOR PROCEDURE (OUTPATIENT)
Dept: CARDIOTHORACIC SURGERY | Facility: HOSPITAL | Age: 45
End: 2025-03-12

## 2025-03-12 ENCOUNTER — OFFICE VISIT (OUTPATIENT)
Dept: SURGERY | Facility: HOSPITAL | Age: 45
End: 2025-03-12
Payer: COMMERCIAL

## 2025-03-12 VITALS
RESPIRATION RATE: 16 BRPM | BODY MASS INDEX: 24.54 KG/M2 | WEIGHT: 191.1 LBS | HEART RATE: 103 BPM | OXYGEN SATURATION: 100 % | TEMPERATURE: 97.3 F | DIASTOLIC BLOOD PRESSURE: 85 MMHG | SYSTOLIC BLOOD PRESSURE: 122 MMHG

## 2025-03-12 DIAGNOSIS — K22.2 ESOPHAGEAL STRICTURE: ICD-10-CM

## 2025-03-12 DIAGNOSIS — K22.2 ESOPHAGEAL STRICTURE: Primary | ICD-10-CM

## 2025-03-12 DIAGNOSIS — K22.89 ESOPHAGEAL MASS: ICD-10-CM

## 2025-03-12 PROCEDURE — 99205 OFFICE O/P NEW HI 60 MIN: CPT | Performed by: THORACIC SURGERY (CARDIOTHORACIC VASCULAR SURGERY)

## 2025-03-12 PROCEDURE — 99215 OFFICE O/P EST HI 40 MIN: CPT | Performed by: THORACIC SURGERY (CARDIOTHORACIC VASCULAR SURGERY)

## 2025-03-12 ASSESSMENT — PAIN SCALES - GENERAL: PAINLEVEL_OUTOF10: 0-NO PAIN

## 2025-03-12 NOTE — PROGRESS NOTES
HPI:   Juan Robb is a 44 y.o.male referred with Progressive dysphagia and a 70 pound weight loss over the last 4 months.  He has noticed a weight loss over last 4 months and 2 months ago began having dysphagia and is now at the point where he can only swallow liquids.  Of note he also has had some back pain for the last several years which may be unrelated to his dysphagia.  He has no other symptoms related to this condition.  He underwent endoscopy on fibber 25th which showed a stricture in the middle third of the esophagus biopsies showed granulation tissue only.  A CAT scan showed a small lung nodule a small lymph node and a liver lesion which has been investigated by MRI.    Past Medical History:   Diagnosis Date    Acute URI 02/07/2025    Foot contusion 02/07/2025    Left ankle pain 02/07/2025    Nonproductive cough 02/07/2025          Current Outpatient Medications:     amitriptyline (Elavil) 100 mg tablet, Take 1.5 tablets (150 mg) by mouth once daily at bedtime., Disp: , Rfl:     amphetamine-dextroamphetamine (Adderall) 30 mg tablet, Take 1 tablet (30 mg) by mouth every 12 hours., Disp: , Rfl:     buprenorphine-naloxone (Suboxone) 8-2 mg SL tablet, Place under the tongue 2 times a day., Disp: , Rfl:     doxepin (SINEquan) 100 mg capsule, Take 2 capsules (200 mg) by mouth once daily at bedtime., Disp: , Rfl:     PSHx:  SHx: current smoker (20ppy),  rare ETOH, or illicit drugs   FMHx: negative for history of thoracic cancer     ROS  General: negative for fever, chills, weight loss, night sweat  Head: negative for severe headache, vision change, blurred vision,   CV: negative for chest pain, dizziness, lightheadedness   Pulm: negative for shortness of breath, dyspnea on exertion, hemoptysis  GI: negative for diarrhea, constipation, abdominal pain, nausea or vomiting, BRBPR  : negative for dysuria, hematuria, incontinence  Skin: negative for rash  Heme: negative for blood thinner, bleeding disorder  or clotting disorder  Endo: negative for heat or cold intolerance, weight gain or weight loss  MSK: negative for rash, edema, weakness    PHYSICAL EXAM  Constitution: well-developed well-nourished in no acute distress  HEENT: NCAT, moist mucosal membrane, neck supple, no crepitus, sclera anicteric  Lymph nodes: no cervical or supraclavicular lymphadenopathy  Cardiac: RRR, normal S1, S2, no mrg  Pulmonary: normal air movement, CTAP, no wcr  Abdomen: soft, non-distended, non-tender, no rigidity, guarding or rebound tenderness, no splenohepatomegaly  Neuro: AOx3, CNII-XII grossly normal  Ext: warm, dry, no edema noted  Skin: dry, clean and intact  Psych: mood and affect wnl    Results    I looked at his chest and abdominal CT which shows a thickening in the middle esophagus.  There is a small adjacent lymph node.  There is a small left upper lobe nodule which appears mostly calcified.    I looked at his barium swallow study which shows a 3 cm stricture in the mid to lower portion of the esophagus.    I looked at his biopsy results which showed granulation tissue only    Assessment and Plan:    This is a 44-year-old male with progressive dysphagia and a possible thickening of the esophagus.  I recommended a PET scan I have also recommend that I perform an endoscopy with dilation and additional biopsies.  I spent a small risk of perforation and potentially life-threatening infection from a dilation and the benefits of obtaining additional information.  All of his questions answered and a consent to EGD dilation and biopsies        Aaron Gay MD  Chief Division of Thoracic and Esophageal Surgery    [facility]   Co-Director, Thoracic Oncology Program    [facility]  Professor of Surgery    [employer]  Office phone: [phone]  Fax: [phone]

## 2025-03-12 NOTE — H&P (VIEW-ONLY)
HPI:   Juan Robb is a 44 y.o.male referred with Progressive dysphagia and a 70 pound weight loss over the last 4 months.  He has noticed a weight loss over last 4 months and 2 months ago began having dysphagia and is now at the point where he can only swallow liquids.  Of note he also has had some back pain for the last several years which may be unrelated to his dysphagia.  He has no other symptoms related to this condition.  He underwent endoscopy on fibber 25th which showed a stricture in the middle third of the esophagus biopsies showed granulation tissue only.  A CAT scan showed a small lung nodule a small lymph node and a liver lesion which has been investigated by MRI.    Past Medical History:   Diagnosis Date    Acute URI 02/07/2025    Foot contusion 02/07/2025    Left ankle pain 02/07/2025    Nonproductive cough 02/07/2025          Current Outpatient Medications:     amitriptyline (Elavil) 100 mg tablet, Take 1.5 tablets (150 mg) by mouth once daily at bedtime., Disp: , Rfl:     amphetamine-dextroamphetamine (Adderall) 30 mg tablet, Take 1 tablet (30 mg) by mouth every 12 hours., Disp: , Rfl:     buprenorphine-naloxone (Suboxone) 8-2 mg SL tablet, Place under the tongue 2 times a day., Disp: , Rfl:     doxepin (SINEquan) 100 mg capsule, Take 2 capsules (200 mg) by mouth once daily at bedtime., Disp: , Rfl:     PSHx:  SHx: current smoker (20ppy),  rare ETOH, or illicit drugs   FMHx: negative for history of thoracic cancer     ROS  General: negative for fever, chills, weight loss, night sweat  Head: negative for severe headache, vision change, blurred vision,   CV: negative for chest pain, dizziness, lightheadedness   Pulm: negative for shortness of breath, dyspnea on exertion, hemoptysis  GI: negative for diarrhea, constipation, abdominal pain, nausea or vomiting, BRBPR  : negative for dysuria, hematuria, incontinence  Skin: negative for rash  Heme: negative for blood thinner, bleeding disorder  or clotting disorder  Endo: negative for heat or cold intolerance, weight gain or weight loss  MSK: negative for rash, edema, weakness    PHYSICAL EXAM  Constitution: well-developed well-nourished in no acute distress  HEENT: NCAT, moist mucosal membrane, neck supple, no crepitus, sclera anicteric  Lymph nodes: no cervical or supraclavicular lymphadenopathy  Cardiac: RRR, normal S1, S2, no mrg  Pulmonary: normal air movement, CTAP, no wcr  Abdomen: soft, non-distended, non-tender, no rigidity, guarding or rebound tenderness, no splenohepatomegaly  Neuro: AOx3, CNII-XII grossly normal  Ext: warm, dry, no edema noted  Skin: dry, clean and intact  Psych: mood and affect wnl    Results    I looked at his chest and abdominal CT which shows a thickening in the middle esophagus.  There is a small adjacent lymph node.  There is a small left upper lobe nodule which appears mostly calcified.    I looked at his barium swallow study which shows a 3 cm stricture in the mid to lower portion of the esophagus.    I looked at his biopsy results which showed granulation tissue only    Assessment and Plan:    This is a 44-year-old male with progressive dysphagia and a possible thickening of the esophagus.  I recommended a PET scan I have also recommend that I perform an endoscopy with dilation and additional biopsies.  I spent a small risk of perforation and potentially life-threatening infection from a dilation and the benefits of obtaining additional information.  All of his questions answered and a consent to EGD dilation and biopsies        Aaron Gay MD  Chief Division of Thoracic and Esophageal Surgery    [facility]   Co-Director, Thoracic Oncology Program    [facility]  Professor of Surgery    [employer]  Office phone: [phone]  Fax: [phone]

## 2025-03-12 NOTE — PATIENT INSTRUCTIONS
Dr Gay would like you to get a PET scan       PET Scan PREP: Nothing to eat or drink 6 hours prior to the scan.  You should limit yourself to a low carb diet the day before the scan.  (meat, fish, cheese, vegetables & eggs).   Please be sure to drink plenty of water the day before the scan and limit your caffeine. Approximate time 2 hours      You will get a call from our office to schedule a surgery date.     *You will need to get Preadmission testing.  You will get a call to schedule once we know the surgery date.     GENERAL PRESURGICAL INSTRUCTIONS    **Please call the office with any questions.  [phone]       MEDICATIONS:  *Take  your regularly scheduled medications the morning of surgery with a sip of water unless instructed by your surgeon.  If you have any questions ask your surgeon.   Exceptions include:  **Blood thinners, (ie: Plavix, Xarelto, Eliquis, Brilinta) check with your surgeon about holding for surgery. (Aspirin 81 mg is ok to take)   *Ibuprofen, multivitamins, fish oil, supplements- STOP 7 days before surgery.  *Lisinopril/ Losartan - HOLD morning of surgery  *Diabetes medications (ie: metformin, glipizide) - HOLD morning of surgery  *Farxiga, Jardiance - Hold 3 days before surgery  *Monyulianaro, Ozempic, Trulicity, Wegovy, Tanzeum,  Bydyreon - HOLD 1 week prior to surgery    CONTACT SURGEON'S OFFICE IF YOU DEVELOP:  * Fever = 100.4 F   * New respiratory symptoms (e.g. cough, shortness of breath, respiratory distress, sore throat)  * Recent loss of taste or smell  *Flu like symptoms such as headache, fatigue or gastrointestinal symptoms  * You develop any open sores, shingles, burning or painful urination   AND/OR:  * You no longer wish to have the surgery.  * Any other personal circumstances change that may lead to the need to cancel or defer this surgery.  *You were admitted to any hospital within one week of your planned procedure.  *There have been any changes to your insurance, address,  or phone number.     SMOKING:  *Stop smoking, using street drugs, or consuming excessive alcohol.   *Quitting smoking can make a huge difference to your health and recovery from surgery.    *If you need help with quitting, call 8-300-QUIT-NOW.       SURGICAL TIME/PARKING AND ARRIVAL:  [place]: *You will be contacted between 8 am and 12 noon the business day before your surgery with your arrival time. *If you haven't received a call by 4pm, call [phone]  Park in the visitor parking garage on [address], take bridge on 2nd floor, the first desk you come to is admitting to check in for surgery.     *Scheduled surgery times may change and you will be notified if this occurs-check your personal voicemail for any updates.    THE DAY BEFORE SURGERY:  *Do not eat any food after midnight the night before surgery.       *Shower with any antibacterial soap the night before and/or the morning of your surgery.  **After showering, do NOT apply hair products, lotions, powders, creams, makeup, nail polish, Vaseline, or deodorant.**       ON THE MORNING OF SURGERY:  *Do not eat or drink anything.  This includes gum or candy.  *You may take your morning meds with a small sips of water.   DIABETICS:  Please check fasting blood sugar  upon waking up.  If fasting sugar is <80 mg/dl, please drink 100ml/3oz of apple juice no later than 2 hours prior to surgery.  *Brush your teeth before coming to the hospital.   *Do not use moisturizers, creams, lotions or perfume.  *Wear comfortable, loose fitting clothing.   *All jewelry and valuables should be left at home.  *Prosthetic devices such as contact lenses, hearing aids, dentures, eyelash extensions, hairpins and body piercing must be removed before surgery.     BRING WITH YOU:  *Photo ID and insurance card  *Current list of medicines and allergies Include dose and how often you take it.   *Pacemaker/Defibrillator/Heart stent cards  *CPAP machine and  mask  *Slings/splints/crutches/walker  *Copy of your complete Advanced Directive/DHPOA-if applicable  *Neurostimulator implant remote       AFTER INPATIENT SURGERY:  *A responsible adult MUST accompany you at the time of discharge and stay with you for 24 hours after your surgery.  *You may NOT drive yourself home after surgery.  *You may use a taxi or ride sharing service ([employer], Uber) to return home ONLY if you are accompanied by a friend or family member.  *Instructions for resuming your medications will be provided by your surgeon.    FOLLOW UP:  *You will be scheduled for a post op follow up visit Dr. Gay about 2 weeks after you leave the hospital.  ([facility] 1st floor Desk B)  *You may be asked to get a Chest XRAY prior to seeing him.  You can get this done the same day as your appointment 30 min prior in Radiology  [facility] 2nd floor).    *If there is pathology this is usually resulted by this appointment for Dr. Gay to go over with you.

## 2025-03-14 ENCOUNTER — HOSPITAL ENCOUNTER (OUTPATIENT)
Dept: RADIOLOGY | Facility: CLINIC | Age: 45
Discharge: HOME | End: 2025-03-14
Payer: COMMERCIAL

## 2025-03-14 DIAGNOSIS — K22.89 ESOPHAGEAL MASS: ICD-10-CM

## 2025-03-14 DIAGNOSIS — K22.2 ESOPHAGEAL STRICTURE: ICD-10-CM

## 2025-03-14 LAB — GLUCOSE BLD MANUAL STRIP-MCNC: 75 MG/DL (ref 74–99)

## 2025-03-14 PROCEDURE — 3430000001 HC RX 343 DIAGNOSTIC RADIOPHARMACEUTICALS: Performed by: THORACIC SURGERY (CARDIOTHORACIC VASCULAR SURGERY)

## 2025-03-14 PROCEDURE — 82947 ASSAY GLUCOSE BLOOD QUANT: CPT

## 2025-03-14 PROCEDURE — A9552 F18 FDG: HCPCS | Performed by: THORACIC SURGERY (CARDIOTHORACIC VASCULAR SURGERY)

## 2025-03-14 PROCEDURE — 78815 PET IMAGE W/CT SKULL-THIGH: CPT | Mod: PI

## 2025-03-14 RX ORDER — FLUDEOXYGLUCOSE F 18 200 MCI/ML
12.46 INJECTION, SOLUTION INTRAVENOUS
Status: COMPLETED | OUTPATIENT
Start: 2025-03-14 | End: 2025-03-14

## 2025-03-14 RX ADMIN — FLUDEOXYGLUCOSE F 18 12.46 MILLICURIE: 200 INJECTION, SOLUTION INTRAVENOUS at 08:23

## 2025-03-18 ENCOUNTER — ANESTHESIA (OUTPATIENT)
Dept: OPERATING ROOM | Facility: HOSPITAL | Age: 45
End: 2025-03-18

## 2025-03-18 ENCOUNTER — ANESTHESIA EVENT (OUTPATIENT)
Dept: OPERATING ROOM | Facility: HOSPITAL | Age: 45
End: 2025-03-18

## 2025-03-18 ENCOUNTER — APPOINTMENT (OUTPATIENT)
Dept: GASTROENTEROLOGY | Facility: CLINIC | Age: 45
End: 2025-03-18
Payer: COMMERCIAL

## 2025-03-19 ENCOUNTER — DOCUMENTATION (OUTPATIENT)
Dept: HEMATOLOGY/ONCOLOGY | Facility: CLINIC | Age: 45
End: 2025-03-19

## 2025-03-19 ENCOUNTER — APPOINTMENT (OUTPATIENT)
Dept: HEMATOLOGY/ONCOLOGY | Facility: CLINIC | Age: 45
End: 2025-03-19
Payer: COMMERCIAL

## 2025-03-19 NOTE — PROGRESS NOTES
Patient missed his liver MRI and appointment with thoracic this week. He rescheduled his procedure with Dr. Gay to next week. I reached out to scheduling to assist with rescheduling his liver MRI. I rescheduled my visit to 04/01/2025.    Mile Mcginnis [facility]

## 2025-03-25 ENCOUNTER — PREP FOR PROCEDURE (OUTPATIENT)
Dept: CARDIOTHORACIC SURGERY | Facility: HOSPITAL | Age: 45
End: 2025-03-25

## 2025-03-25 DIAGNOSIS — K22.2 ESOPHAGEAL STRICTURE: Primary | ICD-10-CM

## 2025-03-27 ENCOUNTER — ANESTHESIA EVENT (OUTPATIENT)
Dept: OPERATING ROOM | Facility: HOSPITAL | Age: 45
End: 2025-03-27
Payer: COMMERCIAL

## 2025-03-27 ENCOUNTER — HOSPITAL ENCOUNTER (OUTPATIENT)
Facility: HOSPITAL | Age: 45
Setting detail: OUTPATIENT SURGERY
Discharge: HOME | End: 2025-03-27
Attending: THORACIC SURGERY (CARDIOTHORACIC VASCULAR SURGERY) | Admitting: THORACIC SURGERY (CARDIOTHORACIC VASCULAR SURGERY)
Payer: COMMERCIAL

## 2025-03-27 ENCOUNTER — ANESTHESIA (OUTPATIENT)
Dept: OPERATING ROOM | Facility: HOSPITAL | Age: 45
End: 2025-03-27
Payer: COMMERCIAL

## 2025-03-27 VITALS
DIASTOLIC BLOOD PRESSURE: 82 MMHG | HEART RATE: 68 BPM | WEIGHT: 196.9 LBS | HEIGHT: 74 IN | OXYGEN SATURATION: 98 % | SYSTOLIC BLOOD PRESSURE: 126 MMHG | RESPIRATION RATE: 16 BRPM | TEMPERATURE: 97.7 F | BODY MASS INDEX: 25.27 KG/M2

## 2025-03-27 DIAGNOSIS — K22.2 ESOPHAGEAL STRICTURE: ICD-10-CM

## 2025-03-27 PROCEDURE — 2720000007 HC OR 272 NO HCPCS: Performed by: THORACIC SURGERY (CARDIOTHORACIC VASCULAR SURGERY)

## 2025-03-27 PROCEDURE — 43233 EGD BALLOON DIL ESOPH30 MM/>: CPT | Performed by: THORACIC SURGERY (CARDIOTHORACIC VASCULAR SURGERY)

## 2025-03-27 PROCEDURE — C1726 CATH, BAL DIL, NON-VASCULAR: HCPCS | Performed by: THORACIC SURGERY (CARDIOTHORACIC VASCULAR SURGERY)

## 2025-03-27 PROCEDURE — 7100000009 HC PHASE TWO TIME - INITIAL BASE CHARGE: Performed by: THORACIC SURGERY (CARDIOTHORACIC VASCULAR SURGERY)

## 2025-03-27 PROCEDURE — 88305 TISSUE EXAM BY PATHOLOGIST: CPT | Performed by: STUDENT IN AN ORGANIZED HEALTH CARE EDUCATION/TRAINING PROGRAM

## 2025-03-27 PROCEDURE — 7100000002 HC RECOVERY ROOM TIME - EACH INCREMENTAL 1 MINUTE: Performed by: THORACIC SURGERY (CARDIOTHORACIC VASCULAR SURGERY)

## 2025-03-27 PROCEDURE — 2500000005 HC RX 250 GENERAL PHARMACY W/O HCPCS: Mod: SE | Performed by: THORACIC SURGERY (CARDIOTHORACIC VASCULAR SURGERY)

## 2025-03-27 PROCEDURE — A43249 PR EDG BALLOON DILATION ESOPHAGUS <30 MM DIAM: Performed by: ANESTHESIOLOGY

## 2025-03-27 PROCEDURE — 88305 TISSUE EXAM BY PATHOLOGIST: CPT | Mod: TC,SUR | Performed by: THORACIC SURGERY (CARDIOTHORACIC VASCULAR SURGERY)

## 2025-03-27 PROCEDURE — 7100000010 HC PHASE TWO TIME - EACH INCREMENTAL 1 MINUTE: Performed by: THORACIC SURGERY (CARDIOTHORACIC VASCULAR SURGERY)

## 2025-03-27 PROCEDURE — 2500000004 HC RX 250 GENERAL PHARMACY W/ HCPCS (ALT 636 FOR OP/ED): Mod: SE

## 2025-03-27 PROCEDURE — 3700000002 HC GENERAL ANESTHESIA TIME - EACH INCREMENTAL 1 MINUTE: Performed by: THORACIC SURGERY (CARDIOTHORACIC VASCULAR SURGERY)

## 2025-03-27 PROCEDURE — 3700000001 HC GENERAL ANESTHESIA TIME - INITIAL BASE CHARGE: Performed by: THORACIC SURGERY (CARDIOTHORACIC VASCULAR SURGERY)

## 2025-03-27 PROCEDURE — 7100000001 HC RECOVERY ROOM TIME - INITIAL BASE CHARGE: Performed by: THORACIC SURGERY (CARDIOTHORACIC VASCULAR SURGERY)

## 2025-03-27 PROCEDURE — 3600000003 HC OR TIME - INITIAL BASE CHARGE - PROCEDURE LEVEL THREE: Performed by: THORACIC SURGERY (CARDIOTHORACIC VASCULAR SURGERY)

## 2025-03-27 PROCEDURE — 3600000008 HC OR TIME - EACH INCREMENTAL 1 MINUTE - PROCEDURE LEVEL THREE: Performed by: THORACIC SURGERY (CARDIOTHORACIC VASCULAR SURGERY)

## 2025-03-27 RX ORDER — LABETALOL HYDROCHLORIDE 5 MG/ML
5 INJECTION, SOLUTION INTRAVENOUS ONCE AS NEEDED
Status: DISCONTINUED | OUTPATIENT
Start: 2025-03-27 | End: 2025-03-27 | Stop reason: HOSPADM

## 2025-03-27 RX ORDER — FENTANYL CITRATE 50 UG/ML
INJECTION, SOLUTION INTRAMUSCULAR; INTRAVENOUS AS NEEDED
Status: DISCONTINUED | OUTPATIENT
Start: 2025-03-27 | End: 2025-03-27

## 2025-03-27 RX ORDER — ONDANSETRON HYDROCHLORIDE 2 MG/ML
4 INJECTION, SOLUTION INTRAVENOUS ONCE AS NEEDED
Status: DISCONTINUED | OUTPATIENT
Start: 2025-03-27 | End: 2025-03-27 | Stop reason: HOSPADM

## 2025-03-27 RX ORDER — FENTANYL CITRATE 50 UG/ML
25 INJECTION, SOLUTION INTRAMUSCULAR; INTRAVENOUS EVERY 5 MIN PRN
Status: DISCONTINUED | OUTPATIENT
Start: 2025-03-27 | End: 2025-03-27 | Stop reason: HOSPADM

## 2025-03-27 RX ORDER — LIDOCAINE HYDROCHLORIDE 10 MG/ML
0.1 INJECTION, SOLUTION INFILTRATION; PERINEURAL ONCE
Status: DISCONTINUED | OUTPATIENT
Start: 2025-03-27 | End: 2025-03-27 | Stop reason: HOSPADM

## 2025-03-27 RX ORDER — HYDRALAZINE HYDROCHLORIDE 20 MG/ML
5 INJECTION INTRAMUSCULAR; INTRAVENOUS EVERY 30 MIN PRN
Status: DISCONTINUED | OUTPATIENT
Start: 2025-03-27 | End: 2025-03-27 | Stop reason: HOSPADM

## 2025-03-27 RX ORDER — SUCCINYLCHOLINE CHLORIDE 20 MG/ML
INJECTION INTRAMUSCULAR; INTRAVENOUS AS NEEDED
Status: DISCONTINUED | OUTPATIENT
Start: 2025-03-27 | End: 2025-03-27

## 2025-03-27 RX ORDER — MIDAZOLAM HYDROCHLORIDE 1 MG/ML
INJECTION INTRAMUSCULAR; INTRAVENOUS AS NEEDED
Status: DISCONTINUED | OUTPATIENT
Start: 2025-03-27 | End: 2025-03-27

## 2025-03-27 RX ORDER — ONDANSETRON HYDROCHLORIDE 2 MG/ML
INJECTION, SOLUTION INTRAVENOUS AS NEEDED
Status: DISCONTINUED | OUTPATIENT
Start: 2025-03-27 | End: 2025-03-27

## 2025-03-27 RX ORDER — WATER 1 ML/ML
INJECTION IRRIGATION AS NEEDED
Status: DISCONTINUED | OUTPATIENT
Start: 2025-03-27 | End: 2025-03-27 | Stop reason: HOSPADM

## 2025-03-27 RX ORDER — FENTANYL CITRATE 50 UG/ML
50 INJECTION, SOLUTION INTRAMUSCULAR; INTRAVENOUS EVERY 5 MIN PRN
Status: DISCONTINUED | OUTPATIENT
Start: 2025-03-27 | End: 2025-03-27 | Stop reason: HOSPADM

## 2025-03-27 RX ORDER — OXYCODONE HYDROCHLORIDE 5 MG/1
5 TABLET ORAL EVERY 4 HOURS PRN
Status: DISCONTINUED | OUTPATIENT
Start: 2025-03-27 | End: 2025-03-27 | Stop reason: HOSPADM

## 2025-03-27 RX ORDER — PROPOFOL 10 MG/ML
INJECTION, EMULSION INTRAVENOUS AS NEEDED
Status: DISCONTINUED | OUTPATIENT
Start: 2025-03-27 | End: 2025-03-27

## 2025-03-27 RX ORDER — SODIUM CHLORIDE 0.9 G/100ML
INJECTION, SOLUTION IRRIGATION AS NEEDED
Status: DISCONTINUED | OUTPATIENT
Start: 2025-03-27 | End: 2025-03-27 | Stop reason: HOSPADM

## 2025-03-27 RX ORDER — LIDOCAINE HYDROCHLORIDE 20 MG/ML
INJECTION, SOLUTION INFILTRATION; PERINEURAL AS NEEDED
Status: DISCONTINUED | OUTPATIENT
Start: 2025-03-27 | End: 2025-03-27

## 2025-03-27 RX ADMIN — SODIUM CHLORIDE, SODIUM LACTATE, POTASSIUM CHLORIDE, AND CALCIUM CHLORIDE: 600; 310; 30; 20 INJECTION, SOLUTION INTRAVENOUS at 15:40

## 2025-03-27 RX ADMIN — ONDANSETRON 4 MG: 2 INJECTION, SOLUTION INTRAMUSCULAR; INTRAVENOUS at 16:24

## 2025-03-27 RX ADMIN — PROPOFOL 30 MG: 10 INJECTION, EMULSION INTRAVENOUS at 16:20

## 2025-03-27 RX ADMIN — FENTANYL CITRATE 50 MCG: 50 INJECTION, SOLUTION INTRAMUSCULAR; INTRAVENOUS at 15:52

## 2025-03-27 RX ADMIN — LIDOCAINE HYDROCHLORIDE 100 MG: 20 INJECTION, SOLUTION INFILTRATION; PERINEURAL at 15:52

## 2025-03-27 RX ADMIN — PROPOFOL 150 MG: 10 INJECTION, EMULSION INTRAVENOUS at 15:52

## 2025-03-27 RX ADMIN — DEXAMETHASONE SODIUM PHOSPHATE 4 MG: 4 INJECTION INTRA-ARTICULAR; INTRALESIONAL; INTRAMUSCULAR; INTRAVENOUS; SOFT TISSUE at 15:52

## 2025-03-27 RX ADMIN — SUCCINYLCHOLINE CHLORIDE 80 MG: 20 INJECTION, SOLUTION INTRAMUSCULAR; INTRAVENOUS at 15:52

## 2025-03-27 RX ADMIN — MIDAZOLAM HYDROCHLORIDE 1 MG: 2 INJECTION, SOLUTION INTRAMUSCULAR; INTRAVENOUS at 15:52

## 2025-03-27 RX ADMIN — FENTANYL CITRATE 50 MCG: 50 INJECTION, SOLUTION INTRAMUSCULAR; INTRAVENOUS at 16:24

## 2025-03-27 ASSESSMENT — PAIN SCALES - GENERAL
PAINLEVEL_OUTOF10: 0 - NO PAIN

## 2025-03-27 ASSESSMENT — PAIN - FUNCTIONAL ASSESSMENT
PAIN_FUNCTIONAL_ASSESSMENT: 0-10

## 2025-03-27 NOTE — ANESTHESIA PREPROCEDURE EVALUATION
Patient: Juan Robb    Procedure Information       Date/Time: 03/27/25 1435    Procedure: EGD, WITH DILATION (Esophagus)    Location: [facility] OR 20 / [facility] OR    Surgeons: Aaron Gay MD            44 yoM w/ PMHx esophageal stricture with dysphagia, small lung lesion/liver lesion being worked up, 70lb weight loss over past 4 months presenting for EGD w/ dilation and bx w/ Dr. Gay 3/28. PMH of TUD (0.5ppd smoker, did smoke on DOS), chronic pain (on buprenorphine, takes intermittently), ADHD (on adderall)    Relevant Problems   Anesthesia (within normal limits)      Cardiac (within normal limits)      Pulmonary (within normal limits)      Neuro (within normal limits)      GI   (+) Esophageal stricture   (+) Other dysphagia      /Renal (within normal limits)      Liver (within normal limits)      Endocrine (within normal limits)      Hematology (within normal limits)      Musculoskeletal (within normal limits)      HEENT (within normal limits)      ID (within normal limits)      Skin (within normal limits)      GYN (within normal limits)        Medication Documentation Review Audit       Reviewed by NARESH Barillas (Nurse Practitioner) on 03/07/25 at 1414      Medication Order Taking? Sig Documenting Provider Last Dose Status   amitriptyline (Elavil) 100 mg tablet [id number] No Take 1.5 tablets (150 mg) by mouth once daily at bedtime. Historical Provider, MD Unknown Active   amphetamine-dextroamphetamine (Adderall) 30 mg tablet [id number] No Take 1 tablet (30 mg) by mouth every 12 hours. Matti Mendieta MD 2/24/2025 Active   buprenorphine-naloxone (Suboxone) 8-2 mg SL tablet [id number] No Place under the tongue 2 times a day. Matti Mendieta MD 2/24/2025 Active   doxepin (SINEquan) 100 mg capsule [id number] No Take 2 capsules (200 mg) by mouth once daily at bedtime. Matti Mendieta MD 2/24/2025 Active                    Visit Vitals  Smoking Status Every Day     "        2/25/2025    12:40 PM 2/25/2025    12:45 PM 2/25/2025    12:50 PM 2/25/2025    12:57 PM 2/25/2025     1:12 PM 2/25/2025     1:27 PM 3/12/2025     2:05 PM   Vitals   Systolic 102 98 117 105 110 108 122   Diastolic 61 55 67 65 68 66 85   BP Location       Left arm   Heart Rate 76 74 88 78 75 69 103   Temp       36.3 °C (97.3 °F)   Resp 20 18 18 16 16 16 16   Weight (lb)       191.1   BMI       24.54 kg/m2   BSA (m2)       2.13 m2   Visit Report       Report        Recent Labs:    Chemistry    Lab Results   Component Value Date/Time     02/14/2025 1311    K 3.8 02/14/2025 1311    CL 97 (L) 02/14/2025 1311    CO2 28 02/14/2025 1311    BUN 15 02/14/2025 1311    CREATININE 0.73 02/14/2025 1311    Lab Results   Component Value Date/Time    CALCIUM 9.3 02/14/2025 1311    ALKPHOS 106 02/14/2025 1311    AST 27 02/14/2025 1311    ALT 40 02/14/2025 1311    BILITOT 0.6 02/14/2025 1311          Lab Results   Component Value Date/Time    WBC 5.6 02/14/2025 1311    HGB 9.9 (L) 02/14/2025 1311    HCT 31.5 (L) 02/14/2025 1311     02/14/2025 1311     No results found for: \"PROTIME\", \"PTT\", \"INR\"    Electrocardiogram:  No results found for this or any previous visit (from the past 4464 hours).    Echocardiogram:      Above information reviewed including relevant HPI, PMHx, PSHx, anesthesia history, labs, and imaging. I discussed the anesthesia plan with the patient and/or family and reviewed the risks, benefits and alternatives. Agree to proceed.            Clinical information reviewed:                   NPO Detail:  No data recorded     Physical Exam    Airway  Mallampati: II  TM distance: >3 FB  Neck ROM: full  Comments: Large beard   Cardiovascular - normal exam     Dental - normal exam     Pulmonary - normal exam     Abdominal - normal exam             Anesthesia Plan    History of general anesthesia?: yes  History of complications of general anesthesia?: no    ASA 3     general   (GETA w/ RSI)  intravenous " induction   Postoperative administration of opioids is intended.  Trial extubation is planned.  Anesthetic plan and risks discussed with patient.  Use of blood products discussed with patient who consented to blood products.    Plan discussed with resident and attending.

## 2025-03-27 NOTE — OP NOTE
EGD, WITH BALLOON DILATION Operative Note     Date: 3/27/2025  OR Location: [facility] OR    Name: Juan Robb, : 1980, Age: 44 y.o., MRN: [id number], Sex: male    Diagnosis  Pre-op Diagnosis      * Esophageal stricture [K22.2] Post-op Diagnosis     * Esophageal stricture [K22.2]     Procedures  EGD with biopsy  Balloon dilation of stricture to 13mm      Surgeons      * Aaron Gay - Primary    Resident/Fellow/Other Assistant:  Surgeons and Role:  * No surgeons found with a matching role *    Staff:   Circulator: Anitha  Scrub Person: Melissa  Scrub Person: Qian    Anesthesia Staff: Anesthesiologist: Bobby Campos MD  Anesthesia Resident: Trent Jacob MD    Procedure Summary  Anesthesia: General  ASA: III  Estimated Blood Loss: 10mL  Intra-op Medications:   Administrations occurring from 1435 to 1600 on 25:   Medication Name Total Dose   dexAMETHasone (Decadron) injection 4 mg/mL 4 mg   fentaNYL (Sublimaze) injection 50 mcg/mL 50 mcg   LR bolus Cannot be calculated   lidocaine (Xylocaine) injection 2 % 100 mg   midazolam PF (Versed) injection 1 mg/mL 1 mg   propofol (Diprivan) injection 10 mg/mL 150 mg   succinylcholine (Anectine) 20 mg/mL injection 80 mg              Anesthesia Record               Intraprocedure I/O Totals          Intake    LR bolus 500.00 mL    Total Intake 500 mL          Specimen:   ID Type Source Tests Collected by Time   1 : BIOPSY AT 34 CM Tissue ESOPHAGUS MID BIOPSY SURGICAL PATHOLOGY EXAM Aaron Gay MD 3/27/2025 1616                 Drains and/or Catheters: * None in log *    Tourniquet Times:         Implants:     Findings: Peptic stricture @32-35cm just above GEJ, 5 cm Hiatal hernia    Indications: Juan Robb is an 44 y.o. male who is having surgery for Esophageal stricture [K22.2].     The patient was seen in the preoperative area. The risks, benefits, complications, treatment options, non-operative alternatives, expected recovery and  outcomes were discussed with the patient. The possibilities of reaction to medication, pulmonary aspiration, injury to surrounding structures, bleeding, recurrent infection, the need for additional procedures, failure to diagnose a condition, and creating a complication requiring transfusion or operation were discussed with the patient. The patient concurred with the proposed plan, giving informed consent.  The site of surgery was properly noted/marked if necessary per policy. The patient has been actively warmed in preoperative area. Preoperative antibiotics are not indicated. Venous thrombosis prophylaxis are not indicated.    Procedure Details: After satisfactory induction of general trach tube anesthesia esophagoscopy was performed..  The inlet was at 15 cm.  The upper esophagus was normal.  The mid esophagus was unremarkable.  At approximately 32 cm a tight stricture.  It would not admit the adult scope and was approximately 6 mm in diameter.  A balloon was passed across the stricture and insufflated to a corresponding diameter of 12 mm.  The balloon was withdrawn.  Minimal bleeding was noted.  The adult scope was then passed diffuse erosion and inflammation was seen circumferentially without any obvious mass.  The stricture was approximately 3 to 4 cm long.  Immediately beneath this was the GE junction which appeared normal in appearance.  An approximately 5 cm hiatal hernia was seen.  The entire stomach including cardia fundus and antrum was normal.  The pylorus was passed.  The duodenum was normal to its third region.  Retroflexion was performed.  The scope was then withdrawn further [place] from 4 antral biopsies for them were taken throughout the stricture.  The balloon was then reintroduced and the stricture was dilated to 13.5 mm.  The balloon was withdrawn and repeat inspection was performed which showed good integrity of the esophagus with minimal bleeding the scope was then withdrawn.  The patient was  awakened extubated brought to recovery room in stable condition.  Complications:  None; patient tolerated the procedure well.    Disposition: PACU - hemodynamically stable.  Condition: stable             Aaron Gay  Phone Number: [phone]

## 2025-03-27 NOTE — ANESTHESIA POSTPROCEDURE EVALUATION
Patient: Juan Robb    Procedure Summary       Date: 03/27/25 Room / Location: [facility] OR 20 / [facility] OR    Anesthesia Start: 1540 Anesthesia Stop: 1640    Procedure: EGD, WITH BALLOON DILATION (Esophagus) Diagnosis:       Esophageal stricture      (Esophageal stricture [K22.2])    Surgeons: Aaron Gay MD Responsible Provider: Bobby Campos MD    Anesthesia Type: general ASA Status: 3            Anesthesia Type: general    Vitals Value Taken Time   /73 03/27/25 1640   Temp 36.4 03/27/25 1640   Pulse 77 03/27/25 1638   Resp 21 03/27/25 1638   SpO2 96 % 03/27/25 1638   Vitals shown include unfiled device data.    Anesthesia Post Evaluation    Patient location during evaluation: PACU  Patient participation: complete - patient participated  Level of consciousness: awake and alert  Pain management: adequate  Airway patency: patent  Cardiovascular status: acceptable  Respiratory status: acceptable  Hydration status: acceptable  Postoperative Nausea and Vomiting: none        No notable events documented.

## 2025-03-27 NOTE — ANESTHESIA PROCEDURE NOTES
Airway  Date/Time: 3/27/2025 3:54 PM  Urgency: elective    Airway not difficult    Staffing  Performed: resident   Authorized by: Bobby Campos MD    Performed by: Trent Jacob MD  Patient location during procedure: OR    Indications and Patient Condition  Indications for airway management: anesthesia and airway protection  Spontaneous ventilation: present  Sedation level: deep  Preoxygenated: yes  Patient position: sniffing  MILS maintained throughout  Mask difficulty assessment: 0 - not attempted  Planned trial extubation    Final Airway Details  Final airway type: endotracheal airway      Successful airway: ETT  Cuffed: yes   Successful intubation technique: direct laryngoscopy  Facilitating devices/methods: intubating stylet  Endotracheal tube insertion site: oral  Blade: Vero  Blade size: #4  ETT size (mm): 7.0  Cormack-Lehane Classification: grade I - full view of glottis  Placement verified by: capnometry   Measured from: teeth  ETT to teeth (cm): 23  Number of attempts at approach: 1    Additional Comments  RSI

## 2025-04-01 ENCOUNTER — TELEPHONE (OUTPATIENT)
Dept: HEMATOLOGY/ONCOLOGY | Facility: CLINIC | Age: 45
End: 2025-04-01
Payer: COMMERCIAL

## 2025-04-01 NOTE — TELEPHONE ENCOUNTER
I attempted to reach patient multiple times for his phone visit today. I received his identified voicemail and left a message with my contact information to return my call.     Mile Mcginnis CNP

## 2025-04-04 LAB
LABORATORY COMMENT REPORT: NORMAL
PATH REPORT.FINAL DX SPEC: NORMAL
PATH REPORT.GROSS SPEC: NORMAL
PATH REPORT.RELEVANT HX SPEC: NORMAL
PATH REPORT.TOTAL CANCER: NORMAL

## 2025-04-11 ENCOUNTER — HOSPITAL ENCOUNTER (OUTPATIENT)
Dept: RADIOLOGY | Facility: HOSPITAL | Age: 45
Discharge: HOME | End: 2025-04-11
Payer: COMMERCIAL

## 2025-04-11 DIAGNOSIS — K22.2 ESOPHAGEAL STRICTURE: ICD-10-CM

## 2025-04-11 DIAGNOSIS — S00.259A METAL FOREIGN BODY IN EYE REGION: ICD-10-CM

## 2025-04-11 PROCEDURE — A9575 INJ GADOTERATE MEGLUMI 0.1ML: HCPCS

## 2025-04-11 PROCEDURE — 70260 X-RAY EXAM OF SKULL: CPT

## 2025-04-11 PROCEDURE — 2550000001 HC RX 255 CONTRASTS

## 2025-04-11 PROCEDURE — 74183 MRI ABD W/O CNTR FLWD CNTR: CPT

## 2025-04-11 RX ORDER — GADOTERATE MEGLUMINE 376.9 MG/ML
17 INJECTION INTRAVENOUS
Status: COMPLETED | OUTPATIENT
Start: 2025-04-11 | End: 2025-04-11

## 2025-04-11 RX ADMIN — GADOTERATE MEGLUMINE 17 ML: 376.9 INJECTION INTRAVENOUS at 07:59

## 2025-04-23 ENCOUNTER — PREP FOR PROCEDURE (OUTPATIENT)
Dept: CARDIOTHORACIC SURGERY | Facility: HOSPITAL | Age: 45
End: 2025-04-23

## 2025-04-23 ENCOUNTER — TELEMEDICINE (OUTPATIENT)
Dept: SURGERY | Facility: HOSPITAL | Age: 45
End: 2025-04-23
Payer: COMMERCIAL

## 2025-04-23 DIAGNOSIS — K22.2 PEPTIC STRICTURE OF ESOPHAGUS: Primary | ICD-10-CM

## 2025-04-23 DIAGNOSIS — K22.2 ESOPHAGEAL STRICTURE: Primary | ICD-10-CM

## 2025-04-23 RX ORDER — ESOMEPRAZOLE MAGNESIUM 40 MG/1
40 CAPSULE, DELAYED RELEASE ORAL DAILY
Qty: 90 CAPSULE | Refills: 1 | Status: SHIPPED | OUTPATIENT
Start: 2025-04-23 | End: 2025-10-20

## 2025-04-23 NOTE — PROGRESS NOTES
Mr. Robb presented with a stricture at the GEJ at 32cm just above a hiatal hernia.  He underwent EGD dilation on 3/27/25 with biopsies.  The native stricture was approximately 6mm in diameter and was dilated by balloon to an estimated 14mm.  The biopsies showed inflammation only.  His swallowing is improved postprocedure and is able to take full liquids without difficulty.  He still has dysphagia to solids.    I explained to the patient that we would recommend additional dilations at this point with continued risks of perforation or life-threatening infection.  I explained alternatives of observation.  He consents to dilations we will place him on a PPI at this point.  Eventually he will likely need an antireflux procedure.    Consent was obtained for this audio only visit.  Total time reviewing chart and in discussions with patient was 10 minutes

## 2025-04-24 ENCOUNTER — HOSPITAL ENCOUNTER (OUTPATIENT)
Facility: HOSPITAL | Age: 45
Setting detail: OUTPATIENT SURGERY
End: 2025-04-24
Attending: THORACIC SURGERY (CARDIOTHORACIC VASCULAR SURGERY) | Admitting: THORACIC SURGERY (CARDIOTHORACIC VASCULAR SURGERY)
Payer: COMMERCIAL

## 2025-04-24 PROBLEM — K22.2 PEPTIC STRICTURE OF ESOPHAGUS: Status: ACTIVE | Noted: 2025-04-23

## 2025-05-05 ENCOUNTER — ANESTHESIA EVENT (OUTPATIENT)
Dept: OPERATING ROOM | Facility: HOSPITAL | Age: 45
End: 2025-05-05

## 2025-05-06 ENCOUNTER — ANESTHESIA (OUTPATIENT)
Dept: OPERATING ROOM | Facility: HOSPITAL | Age: 45
End: 2025-05-06
Payer: COMMERCIAL

## 2025-05-14 ENCOUNTER — APPOINTMENT (OUTPATIENT)
Dept: SURGERY | Facility: HOSPITAL | Age: 45
End: 2025-05-14
Payer: COMMERCIAL

## (undated) DEVICE — SYRINGE, 35 CC, LUER LOCK, MONOJECT, W/O CAP, LF

## (undated) DEVICE — ADAPTER, WATER BOTTLE, SMART CAP, 140/160/180 SERIES

## (undated) DEVICE — TUBING, SUCTION, CONNECTING, STERILE 0.25 X 120 IN., LF

## (undated) DEVICE — Device

## (undated) DEVICE — BOWL, BASIN, 32 OZ, STERILE

## (undated) DEVICE — ADAPTER, LUER STUB, 16 G

## (undated) DEVICE — MARKER, SKIN, RULER AND LABEL PACK, CUSTOM

## (undated) DEVICE — COVER, TABLE, 44 X 75 IN, DISPOSABLE, LF, STERILE

## (undated) DEVICE — COVER, CART, 45 X 27 X 48 IN, CLEAR

## (undated) DEVICE — SYRINGE, 10 CC, SLIP TIP

## (undated) DEVICE — MANIFOLD, 4 PORT NEPTUNE STANDARD

## (undated) DEVICE — REST, HEAD, BAGEL, 9 IN

## (undated) DEVICE — DEVICE, INFLATION, ENDOTEK BIG 60